# Patient Record
Sex: MALE | NOT HISPANIC OR LATINO | URBAN - METROPOLITAN AREA
[De-identification: names, ages, dates, MRNs, and addresses within clinical notes are randomized per-mention and may not be internally consistent; named-entity substitution may affect disease eponyms.]

---

## 2021-05-26 ENCOUNTER — RECORDS - HEALTHEAST (OUTPATIENT)
Dept: ADMINISTRATIVE | Facility: CLINIC | Age: 36
End: 2021-05-26

## 2022-08-20 ENCOUNTER — HOSPITAL ENCOUNTER (EMERGENCY)
Facility: CLINIC | Age: 37
Discharge: HOME | End: 2022-08-20
Attending: EMERGENCY MEDICINE | Admitting: EMERGENCY MEDICINE
Payer: COMMERCIAL

## 2022-08-20 VITALS
SYSTOLIC BLOOD PRESSURE: 110 MMHG | TEMPERATURE: 98.4 F | OXYGEN SATURATION: 98 % | DIASTOLIC BLOOD PRESSURE: 80 MMHG | RESPIRATION RATE: 25 BRPM | HEART RATE: 70 BPM

## 2022-08-20 DIAGNOSIS — T40.601A NARCOTIC OVERDOSE, ACCIDENTAL OR UNINTENTIONAL, INITIAL ENCOUNTER (H): ICD-10-CM

## 2022-08-20 PROCEDURE — 99283 EMERGENCY DEPT VISIT LOW MDM: CPT

## 2022-08-20 PROCEDURE — 93005 ELECTROCARDIOGRAM TRACING: CPT

## 2022-08-20 ASSESSMENT — ACTIVITIES OF DAILY LIVING (ADL): ADLS_ACUITY_SCORE: 35

## 2022-08-21 NOTE — ED NOTES
Pt ambulatory and alert and oriented x 4.  States he can take the bus back to his treatment center.  [facility] not answering phone calls at this time.

## 2022-08-21 NOTE — ED TRIAGE NOTES
Pt presents to the ED via EMS in restraints after being found sleeping in a field by his drug treatment housing.  Per EMS pt initially only responding to painful stimuli, more alert after narcan x2.  Pt states he is residing in a treatment facility, denies drug use.  Pt drowsy, wakes with verbal stimulation.       Triage Assessment     Row Name 08/20/22 5437       Triage Assessment (Adult)    Airway WDL WDL       Respiratory WDL    Respiratory WDL WDL       Cardiac WDL    Cardiac WDL WDL       Peripheral/Neurovascular WDL    Peripheral Neurovascular WDL WDL       Cognitive/Neuro/Behavioral WDL    Cognitive/Neuro/Behavioral WDL WDL

## 2022-08-21 NOTE — ED PROVIDER NOTES
History     Chief Complaint:  Drug Overdose    HPI   Sudhir Monsalve is a 36 year old male with no significant medical history on file who presents to the emergency department for evaluation of a drug overdose.  Patient apparently staying at a treatment facility when he had disappeared.  When they found any.  He sleeping outside somewhere.  He is very difficult to arouse EMS was called and he received Narcan by them.  He became more responsive.  Denies taking any drug use currently.  Is somnolent but awakes to verbal stimuli.    Review of Systems   Unable to perform ROS: Other   drug use    Allergies:  The patient has no known allergies on file.     Medications:    The patient has no known medications on file.      Past Medical History:    Methamphetamine abuse   Tobacco use   Marijuana use     Social History:  The patient presents to the ED via EMS.     Physical Exam     Patient Vitals for the past 24 hrs:   BP Temp Temp src Pulse Resp SpO2   08/20/22 2224 -- 98.4  F (36.9  C) Temporal -- -- --   08/20/22 2223 -- -- -- 65 23 97 %   08/20/22 2215 -- -- -- 69 13 97 %   08/20/22 2203 -- -- -- -- -- 96 %   08/20/22 2134 -- -- -- 69 11 99 %   08/20/22 2130 124/67 -- -- 55 14 99 %   08/20/22 2120 -- -- -- 60 12 95 %   08/20/22 2115 -- -- -- 50 17 100 %   08/20/22 2103 (!) 140/79 -- -- 53 18 99 %       Physical Exam  Gen: Sleeping comfortably awakens easily  Oral : Mucous membranes moist,   Nose: No rhinorhea  Ears: External near normal, without drainage  Eyes: Pupils 4 mm and symmetric  Neck: supple, no abnormal swelling  Lungs: Clear bilaterally, no tachypnea or distress, speaks full sentences  CV: Regular rate, regular rhythm  Abd: soft, nontender, nondistended, no rebound/guarding  Ext: no lower extremity edema  Skin: warm, dry, well perfused, no rashes/bruising/lesions on exposed skin  Neuro: alert, no gross motor or sensory deficits,   Psych: pleasant mood, normal affect    Emergency Department Course      Emergency Department Course:    Reviewed:  I reviewed nursing notes, vitals, past medical history and Care Everywhere    Assessments:  2100 I obtained history and examined the patient as noted above.       Disposition:  The patient was discharged to home.     Impression & Plan      Medical Decision Making:  Patient presents emergency department after what seems likely to be narcotic overdose.  Currently living in a treatment facility and had eloped it sounds like.  Was found less responsive but responded to Narcan in route.  Patient was observed for several hours in the emergency department and gradually became less somnolent.  Patient began requesting discharge.  I see no indication for hold.  Seems stable on his feet April of making complex decisions.  We attempted to contact the treatment facility but were unable, no one answering the phone there.  Plan to discharge patient onto his own accord at his request.        Diagnosis:    ICD-10-CM    1. Narcotic overdose, accidental or unintentional, initial encounter (H)  T40.601A        Discharge Medications:  New Prescriptions    No medications on file     Scribe Disclosure:  I, Carolinaisabel Gonzáles, am serving as a scribe at 9:00 PM on 8/20/2022 to document services personally performed by Alfonso Vu MD based on my observations and the provider's statements to me.      Alfonso Vu MD  08/20/22 9785